# Patient Record
Sex: FEMALE | Race: WHITE
[De-identification: names, ages, dates, MRNs, and addresses within clinical notes are randomized per-mention and may not be internally consistent; named-entity substitution may affect disease eponyms.]

---

## 2024-06-19 ENCOUNTER — NON-APPOINTMENT (OUTPATIENT)
Age: 29
End: 2024-06-19

## 2024-06-19 PROBLEM — Z00.00 ENCOUNTER FOR PREVENTIVE HEALTH EXAMINATION: Status: ACTIVE | Noted: 2024-06-19

## 2024-06-20 ENCOUNTER — NON-APPOINTMENT (OUTPATIENT)
Age: 29
End: 2024-06-20

## 2024-06-20 ENCOUNTER — APPOINTMENT (OUTPATIENT)
Dept: OTOLARYNGOLOGY | Facility: CLINIC | Age: 29
End: 2024-06-20
Payer: COMMERCIAL

## 2024-06-20 VITALS
HEART RATE: 85 BPM | DIASTOLIC BLOOD PRESSURE: 83 MMHG | SYSTOLIC BLOOD PRESSURE: 126 MMHG | TEMPERATURE: 99.5 F | OXYGEN SATURATION: 97 % | BODY MASS INDEX: 29.16 KG/M2 | WEIGHT: 175 LBS | HEIGHT: 65 IN

## 2024-06-20 DIAGNOSIS — R49.0 DYSPHONIA: ICD-10-CM

## 2024-06-20 DIAGNOSIS — J38.2 NODULES OF VOCAL CORDS: ICD-10-CM

## 2024-06-20 DIAGNOSIS — R06.2 WHEEZING: ICD-10-CM

## 2024-06-20 DIAGNOSIS — Z81.8 FAMILY HISTORY OF OTHER MENTAL AND BEHAVIORAL DISORDERS: ICD-10-CM

## 2024-06-20 DIAGNOSIS — Z86.59 PERSONAL HISTORY OF OTHER MENTAL AND BEHAVIORAL DISORDERS: ICD-10-CM

## 2024-06-20 DIAGNOSIS — Z78.9 OTHER SPECIFIED HEALTH STATUS: ICD-10-CM

## 2024-06-20 PROCEDURE — 99204 OFFICE O/P NEW MOD 45 MIN: CPT | Mod: 25

## 2024-06-20 PROCEDURE — 70371 SPEECH EVALUATION COMPLEX: CPT | Mod: 59

## 2024-06-20 PROCEDURE — 31579 LARYNGOSCOPY TELESCOPIC: CPT

## 2024-06-20 RX ORDER — LAMOTRIGINE 200 MG/1
200 TABLET ORAL
Refills: 0 | Status: ACTIVE | COMMUNITY

## 2024-06-20 RX ORDER — LAMOTRIGINE 150 MG/1
150 TABLET ORAL
Refills: 0 | Status: ACTIVE | COMMUNITY

## 2024-06-20 RX ORDER — FAMOTIDINE 40 MG/1
40 TABLET, FILM COATED ORAL
Refills: 0 | Status: ACTIVE | COMMUNITY

## 2024-06-20 RX ORDER — CYCLOBENZAPRINE HYDROCHLORIDE 7.5 MG/1
TABLET, FILM COATED ORAL
Refills: 0 | Status: ACTIVE | COMMUNITY

## 2024-06-20 NOTE — ASSESSMENT
[FreeTextEntry1] : - 6/20/2024 28-year-old female who presents with concern for multiple throat related issues.  She notes that she will have intermittent wheezing, as well as monthly bouts of laryngitis.  On exam today video stroboscopy was essentially normal.  She does have additive mass lesions that are consistent with nodules.  I do not see any evidence of vocal fold dysfunction at this time.  At this point I am recommending voice hygiene and follow-up with speech pathology, for further evaluation again of both nodules as well as potential vocal fold dysfunction.  I am also recommending consultation with pulmonology for formal PFTs to officially rule out asthma as a source of wheezing.  Patient will follow-up with me in 2 to 3 months, sooner should symptoms worsen or fail to improve  - Voice hygiene, handout given - Consultation with speech pathology - Consultation with pulmonology, rule out asthma with PFTs - Follow-up with me in 2 to 3 months, sooner should symptoms worsen or fail to improve.

## 2024-06-20 NOTE — PHYSICAL EXAM
[Normal] : mucosa is normal [Midline] : trachea located in midline position [FreeTextEntry1] : Mild hoarseness, normal range, pitch control and projection

## 2024-06-20 NOTE — HISTORY OF PRESENT ILLNESS
[de-identified] : - 6/20/24 29 yo female who presents with concern for throat issues for 3-4 months.  She notes that wheezing when laughing and sometimes will have coughing with wheezing as well.  She feels that she can't project as well as she could x several months.  She also feels that she is getting laryngitis more frequently even without voice use.  This is now occurring for a few days per month.  No issues chewing, eating or swallowing foods.  No currently changes to voice.   She works in [employer] and uses her voice all day.  She also uses her voice after work as well, she is gregarious.  She had pulmonary function testing and is believe not to have asthma.  No throat issues with strong smells such as smoke or cologne.  She will get a small wheeze when out of breath with exertion.

## 2024-06-20 NOTE — PROCEDURE
[de-identified] : - Procedure Note  Pre-operative Diagnosis: Dysphonia Post-operative Diagnosis: Bilateral additive mass lesion anterior one third likely consistent with nodules Anesthesia: Topical - 1 % Lidocaine/Phenylephrine Procedure:  Flexible Laryngoscopy with Stroboscopy - [facility] [address]   Procedure Details:   The patient was placed in the sitting position.  After decongestant and anesthesia were applied the laryngoscope was passed.  The nasal cavities, nasopharynx, oropharynx, hypopharynx, and larynx were all examined.  Vocal folds were examined during respiration and phonation.  The following findings were noted:  Findings:   Nose: Septum is midline, turbinates are normal, nasal airways patent, mucosa normal Nasopharynx: Adenoids normal, no masses, eustachian tube normal Oropharynx: Pharyngeal walls symmetric and without lesion. Tonsils/fossae symmetric Hypopharynx: Hypopharynx and pyriform sinuses without lesion. No masses or asymmetry.  No pooling of secretions. Larynx:  Epiglottis and aryepiglottic folds were sharp and crisp bilaterally.  Bilateral false vocal folds normal appearance. Airway was widely patent.  Strobe Exam Ratings 		 TVF Appearance: Additive mass lesion anterior one third TVF Mobility: normal Edema/hypertrophy: normal Mucus on TVF: normal Glottic Closure: Hourglass glottic gap Mucosal Wave: Reduced Amplitude of Vibration: Reduced Phase: symmetric Supraglottic Hyperfunction: + Other Findings: none  Condition: Stable.  Patient tolerated procedure well.  Complications: None  --------------------------------------------------------------------  Procedure: Pharyngeal and speech evaluation, by cine or video - CPT 08184	 Voice assessment was recorded via video recording on this date.  Clarity: Hoarse and raspy Range: Normal Pitch Control: Normal Projection: Normal Tremor: None Cough: None  Condition: Stable.  Patient tolerated procedure well. Complications: None

## 2024-07-29 ENCOUNTER — APPOINTMENT (OUTPATIENT)
Dept: OTOLARYNGOLOGY | Facility: CLINIC | Age: 29
End: 2024-07-29
Payer: COMMERCIAL

## 2024-07-29 PROCEDURE — 92524 BEHAVRAL QUALIT ANALYS VOICE: CPT | Mod: GN

## 2024-07-29 PROCEDURE — 31579 LARYNGOSCOPY TELESCOPIC: CPT

## 2024-08-07 ENCOUNTER — APPOINTMENT (OUTPATIENT)
Dept: OTOLARYNGOLOGY | Facility: CLINIC | Age: 29
End: 2024-08-07

## 2024-08-07 PROCEDURE — 92507 TX SP LANG VOICE COMM INDIV: CPT | Mod: GN

## 2024-08-23 ENCOUNTER — APPOINTMENT (OUTPATIENT)
Dept: OTOLARYNGOLOGY | Facility: CLINIC | Age: 29
End: 2024-08-23
Payer: COMMERCIAL

## 2024-08-23 ENCOUNTER — APPOINTMENT (OUTPATIENT)
Dept: PULMONOLOGY | Facility: CLINIC | Age: 29
End: 2024-08-23
Payer: COMMERCIAL

## 2024-08-23 VITALS — WEIGHT: 175 LBS | HEIGHT: 65 IN | HEART RATE: 112 BPM | OXYGEN SATURATION: 98 % | BODY MASS INDEX: 29.16 KG/M2

## 2024-08-23 VITALS
TEMPERATURE: 97.3 F | OXYGEN SATURATION: 98 % | DIASTOLIC BLOOD PRESSURE: 80 MMHG | SYSTOLIC BLOOD PRESSURE: 124 MMHG | HEIGHT: 65 IN | BODY MASS INDEX: 29.16 KG/M2 | HEART RATE: 88 BPM | WEIGHT: 175 LBS

## 2024-08-23 DIAGNOSIS — J38.2 NODULES OF VOCAL CORDS: ICD-10-CM

## 2024-08-23 DIAGNOSIS — R06.2 WHEEZING: ICD-10-CM

## 2024-08-23 DIAGNOSIS — Z86.59 PERSONAL HISTORY OF OTHER MENTAL AND BEHAVIORAL DISORDERS: ICD-10-CM

## 2024-08-23 DIAGNOSIS — R49.0 DYSPHONIA: ICD-10-CM

## 2024-08-23 PROCEDURE — 99204 OFFICE O/P NEW MOD 45 MIN: CPT | Mod: 25

## 2024-08-23 PROCEDURE — 94010 BREATHING CAPACITY TEST: CPT

## 2024-08-23 PROCEDURE — 95012 NITRIC OXIDE EXP GAS DETER: CPT

## 2024-08-23 PROCEDURE — 99214 OFFICE O/P EST MOD 30 MIN: CPT | Mod: 25

## 2024-08-23 PROCEDURE — 31579 LARYNGOSCOPY TELESCOPIC: CPT

## 2024-08-23 NOTE — PROCEDURE
[de-identified] : - Procedure Note  Pre-operative Diagnosis: Dysphonia Post-operative Diagnosis: Bilateral additive mass lesion anterior one third likely consistent with nodules- improved compared with previous Anesthesia: Topical - 1 % Lidocaine/Phenylephrine Procedure:  Flexible Laryngoscopy with Stroboscopy - [facility] [address]   Procedure Details:   The patient was placed in the sitting position.  After decongestant and anesthesia were applied the laryngoscope was passed.  The nasal cavities, nasopharynx, oropharynx, hypopharynx, and larynx were all examined.  Vocal folds were examined during respiration and phonation.  The following findings were noted:  Findings:   Nose: Septum is midline, turbinates are normal, nasal airways patent, mucosa normal Nasopharynx: Adenoids normal, no masses, eustachian tube normal Oropharynx: Pharyngeal walls symmetric and without lesion. Tonsils/fossae symmetric Hypopharynx: Hypopharynx and pyriform sinuses without lesion. No masses or asymmetry.  No pooling of secretions. Larynx:  Epiglottis and aryepiglottic folds were sharp and crisp bilaterally.  Bilateral false vocal folds normal appearance. Airway was widely patent.  Strobe Exam Ratings 		 TVF Appearance: Additive mass lesion anterior one third- improved compared with previous TVF Mobility: normal Edema/hypertrophy: normal Mucus on TVF: normal Glottic Closure: Hourglass glottic gap Mucosal Wave: Reduced Amplitude of Vibration: Reduced Phase: symmetric Supraglottic Hyperfunction: + Other Findings: none  Condition: Stable.  Patient tolerated procedure well.  Complications: None  --------------------------------------------------------------------  Procedure: Pharyngeal and speech evaluation, by cine or video - CPT 87601	 Voice assessment was recorded via video recording on this date.  Clarity: Hoarse and raspy Range: Normal Pitch Control: Normal Projection: Normal Tremor: None Cough: None  Condition: Stable.  Patient tolerated procedure well. Complications: None

## 2024-08-23 NOTE — HISTORY OF PRESENT ILLNESS
[de-identified] : - 6/20/24 29 yo female who presents with concern for throat issues for 3-4 months.  She notes that wheezing when laughing and sometimes will have coughing with wheezing as well.  She feels that she can't project as well as she could x several months.  She also feels that she is getting laryngitis more frequently even without voice use.  This is now occurring for a few days per month.  No issues chewing, eating or swallowing foods.  No currently changes to voice.   She works in [employer] and uses her voice all day.  She also uses her voice after work as well, she is gregarious.  She had pulmonary function testing and is believe not to have asthma.  No throat issues with strong smells such as smoke or cologne.  She will get a small wheeze when out of breath with exertion. - [FreeTextEntry1] : 8/23/24: Patient has been working with SLP and is doing voice exercises and notes "48%" improvement in terms of her voice. She saw Dr. Flowers since last visit and was found to have mild restriction on spirometry, methacholine challenge pending.  No new ENT issues otherwise.

## 2024-08-23 NOTE — PROCEDURE
[de-identified] : - Procedure Note  Pre-operative Diagnosis: Dysphonia Post-operative Diagnosis: Bilateral additive mass lesion anterior one third likely consistent with nodules- improved compared with previous Anesthesia: Topical - 1 % Lidocaine/Phenylephrine Procedure:  Flexible Laryngoscopy with Stroboscopy - [facility] [address]   Procedure Details:   The patient was placed in the sitting position.  After decongestant and anesthesia were applied the laryngoscope was passed.  The nasal cavities, nasopharynx, oropharynx, hypopharynx, and larynx were all examined.  Vocal folds were examined during respiration and phonation.  The following findings were noted:  Findings:   Nose: Septum is midline, turbinates are normal, nasal airways patent, mucosa normal Nasopharynx: Adenoids normal, no masses, eustachian tube normal Oropharynx: Pharyngeal walls symmetric and without lesion. Tonsils/fossae symmetric Hypopharynx: Hypopharynx and pyriform sinuses without lesion. No masses or asymmetry.  No pooling of secretions. Larynx:  Epiglottis and aryepiglottic folds were sharp and crisp bilaterally.  Bilateral false vocal folds normal appearance. Airway was widely patent.  Strobe Exam Ratings 		 TVF Appearance: Additive mass lesion anterior one third- improved compared with previous TVF Mobility: normal Edema/hypertrophy: normal Mucus on TVF: normal Glottic Closure: Hourglass glottic gap Mucosal Wave: Reduced Amplitude of Vibration: Reduced Phase: symmetric Supraglottic Hyperfunction: + Other Findings: none  Condition: Stable.  Patient tolerated procedure well.  Complications: None  --------------------------------------------------------------------  Procedure: Pharyngeal and speech evaluation, by cine or video - CPT 97939	 Voice assessment was recorded via video recording on this date.  Clarity: Hoarse and raspy Range: Normal Pitch Control: Normal Projection: Normal Tremor: None Cough: None  Condition: Stable.  Patient tolerated procedure well. Complications: None

## 2024-08-23 NOTE — ASSESSMENT
[FreeTextEntry1] : Data reviewed:  [place] 08/23/2024 : mild restriction, FEV1 76% / FENO 12  Impression: Wheezing  Plan: No evidence asthma on exam today but does have fam hx and some environmental triggers. Given daily symptoms, merits further eval w methacholine challenge.

## 2024-08-23 NOTE — HISTORY OF PRESENT ILLNESS
[de-identified] : - 6/20/24 27 yo female who presents with concern for throat issues for 3-4 months.  She notes that wheezing when laughing and sometimes will have coughing with wheezing as well.  She feels that she can't project as well as she could x several months.  She also feels that she is getting laryngitis more frequently even without voice use.  This is now occurring for a few days per month.  No issues chewing, eating or swallowing foods.  No currently changes to voice.   She works in [employer] and uses her voice all day.  She also uses her voice after work as well, she is gregarious.  She had pulmonary function testing and is believe not to have asthma.  No throat issues with strong smells such as smoke or cologne.  She will get a small wheeze when out of breath with exertion. - [FreeTextEntry1] : 8/23/24: Patient has been working with SLP and is doing voice exercises and notes "48%" improvement in terms of her voice. She saw Dr. Flowers since last visit and was found to have mild restriction on spirometry, methacholine challenge pending.  No new ENT issues otherwise.

## 2024-08-23 NOTE — HISTORY OF PRESENT ILLNESS
[Never] : never [TextBox_4] : 08/23/2024: Asked to evaluate patient by Dr Mcleod for wheezing. Never smoker w no hx lung disease but fam hx asthma in sister. Few months ago started to notice that if she laughed or coughed she would wheeze. Cough became more frequent and would wheeze w the cough. Exercises without dyspnea. But recently has at times wheezed with exercise as well. It sounds like the wheeze is mostly limited to the exact moment of coughing or laughing, but at one point it did at least last long enough that her sister gave her a neb. Her sister is a [facility] fellow at [employer]. Only recent environmental change is getting a new job after being unemployed for a year (which was very stressful). Started the new job in March but her symptoms started before that. Working in [employer]. Lives in [place], does have some bathroom mold. Has been using chemicals to try to clean this which may add to the wheezing. No pets or pests.

## 2024-08-23 NOTE — ASSESSMENT
[FreeTextEntry1] : - 6/20/2024 28-year-old female who presents with concern for multiple throat related issues.  She notes that she will have intermittent wheezing, as well as monthly bouts of laryngitis.  On exam today video stroboscopy was essentially normal.  She does have additive mass lesions that are consistent with nodules.  I do not see any evidence of vocal fold dysfunction at this time.  At this point I am recommending voice hygiene and follow-up with speech pathology, for further evaluation again of both nodules as well as potential vocal fold dysfunction.  I am also recommending consultation with pulmonology for formal PFTs to officially rule out asthma as a source of wheezing.  Patient will follow-up with me in 2 to 3 months, sooner should symptoms worsen or fail to improve  8/23/24: Patient has been working with SLP and is doing voice exercises and notes 48% improvement in terms of her voice. She saw Dr. Flowers since last visit and was found to have mild restriction on spirometry, methacholine challenge pending. On physical exam/ laryngosocopy/ strobosocopy she was found to have some improvement in bilateral additive mass lesions. I recommended she continue to work with SLP and to follow up with Dr. Flowers. Follow up with me in 2 months.  - Voice hygiene, handout given - Continue to work with speech pathology/ speech exercises - Continue to follow up with pulmonology for methacholine challenge - Follow-up with me in 2 months, sooner should symptoms worsen or fail to improve.
[employer]

## 2024-08-23 NOTE — PROCEDURE
[de-identified] : - Procedure Note  Pre-operative Diagnosis: Dysphonia Post-operative Diagnosis: Bilateral additive mass lesion anterior one third likely consistent with nodules- improved compared with previous Anesthesia: Topical - 1 % Lidocaine/Phenylephrine Procedure:  Flexible Laryngoscopy with Stroboscopy - [facility] [address]   Procedure Details:   The patient was placed in the sitting position.  After decongestant and anesthesia were applied the laryngoscope was passed.  The nasal cavities, nasopharynx, oropharynx, hypopharynx, and larynx were all examined.  Vocal folds were examined during respiration and phonation.  The following findings were noted:  Findings:   Nose: Septum is midline, turbinates are normal, nasal airways patent, mucosa normal Nasopharynx: Adenoids normal, no masses, eustachian tube normal Oropharynx: Pharyngeal walls symmetric and without lesion. Tonsils/fossae symmetric Hypopharynx: Hypopharynx and pyriform sinuses without lesion. No masses or asymmetry.  No pooling of secretions. Larynx:  Epiglottis and aryepiglottic folds were sharp and crisp bilaterally.  Bilateral false vocal folds normal appearance. Airway was widely patent.  Strobe Exam Ratings 		 TVF Appearance: Additive mass lesion anterior one third- improved compared with previous TVF Mobility: normal Edema/hypertrophy: normal Mucus on TVF: normal Glottic Closure: Hourglass glottic gap Mucosal Wave: Reduced Amplitude of Vibration: Reduced Phase: symmetric Supraglottic Hyperfunction: + Other Findings: none  Condition: Stable.  Patient tolerated procedure well.  Complications: None  --------------------------------------------------------------------  Procedure: Pharyngeal and speech evaluation, by cine or video - CPT 22609	 Voice assessment was recorded via video recording on this date.  Clarity: Hoarse and raspy Range: Normal Pitch Control: Normal Projection: Normal Tremor: None Cough: None  Condition: Stable.  Patient tolerated procedure well. Complications: None

## 2024-08-23 NOTE — HISTORY OF PRESENT ILLNESS
[de-identified] : - 6/20/24 27 yo female who presents with concern for throat issues for 3-4 months.  She notes that wheezing when laughing and sometimes will have coughing with wheezing as well.  She feels that she can't project as well as she could x several months.  She also feels that she is getting laryngitis more frequently even without voice use.  This is now occurring for a few days per month.  No issues chewing, eating or swallowing foods.  No currently changes to voice.   She works in [employer] and uses her voice all day.  She also uses her voice after work as well, she is gregarious.  She had pulmonary function testing and is believe not to have asthma.  No throat issues with strong smells such as smoke or cologne.  She will get a small wheeze when out of breath with exertion. - [FreeTextEntry1] : 8/23/24: Patient has been working with SLP and is doing voice exercises and notes "48%" improvement in terms of her voice. She saw Dr. Flowers since last visit and was found to have mild restriction on spirometry, methacholine challenge pending.  No new ENT issues otherwise.

## 2024-09-04 ENCOUNTER — APPOINTMENT (OUTPATIENT)
Dept: OTOLARYNGOLOGY | Facility: CLINIC | Age: 29
End: 2024-09-04

## 2024-09-04 PROCEDURE — 92507 TX SP LANG VOICE COMM INDIV: CPT | Mod: GN

## 2024-09-10 ENCOUNTER — NON-APPOINTMENT (OUTPATIENT)
Age: 29
End: 2024-09-10

## 2024-09-11 ENCOUNTER — APPOINTMENT (OUTPATIENT)
Dept: PULMONOLOGY | Facility: CLINIC | Age: 29
End: 2024-09-11
Payer: COMMERCIAL

## 2024-09-11 PROCEDURE — 95070 INHLJ BRNCL CHALLENGE TSTG: CPT

## 2024-09-11 PROCEDURE — 94070 EVALUATION OF WHEEZING: CPT

## 2024-09-25 ENCOUNTER — APPOINTMENT (OUTPATIENT)
Dept: OTOLARYNGOLOGY | Facility: CLINIC | Age: 29
End: 2024-09-25

## 2024-10-16 ENCOUNTER — APPOINTMENT (OUTPATIENT)
Dept: OTOLARYNGOLOGY | Facility: CLINIC | Age: 29
End: 2024-10-16
Payer: COMMERCIAL

## 2024-10-16 PROCEDURE — 92507 TX SP LANG VOICE COMM INDIV: CPT | Mod: GN

## 2024-10-25 ENCOUNTER — APPOINTMENT (OUTPATIENT)
Dept: OTOLARYNGOLOGY | Facility: CLINIC | Age: 29
End: 2024-10-25
Payer: COMMERCIAL

## 2024-10-25 VITALS
SYSTOLIC BLOOD PRESSURE: 108 MMHG | OXYGEN SATURATION: 98 % | TEMPERATURE: 97.3 F | WEIGHT: 179 LBS | DIASTOLIC BLOOD PRESSURE: 71 MMHG | HEART RATE: 88 BPM | BODY MASS INDEX: 29.82 KG/M2 | HEIGHT: 65 IN

## 2024-10-25 DIAGNOSIS — R49.0 DYSPHONIA: ICD-10-CM

## 2024-10-25 DIAGNOSIS — J38.2 NODULES OF VOCAL CORDS: ICD-10-CM

## 2024-10-25 DIAGNOSIS — R06.2 WHEEZING: ICD-10-CM

## 2024-10-25 PROCEDURE — 99215 OFFICE O/P EST HI 40 MIN: CPT | Mod: 25

## 2024-10-25 PROCEDURE — 31579 LARYNGOSCOPY TELESCOPIC: CPT

## 2024-11-20 ENCOUNTER — APPOINTMENT (OUTPATIENT)
Dept: OTOLARYNGOLOGY | Facility: CLINIC | Age: 29
End: 2024-11-20
Payer: COMMERCIAL

## 2024-11-20 PROCEDURE — 92507 TX SP LANG VOICE COMM INDIV: CPT | Mod: GN

## 2025-01-31 ENCOUNTER — NON-APPOINTMENT (OUTPATIENT)
Age: 30
End: 2025-01-31

## 2025-01-31 ENCOUNTER — APPOINTMENT (OUTPATIENT)
Dept: OTOLARYNGOLOGY | Facility: CLINIC | Age: 30
End: 2025-01-31
Payer: COMMERCIAL

## 2025-01-31 VITALS
HEART RATE: 95 BPM | SYSTOLIC BLOOD PRESSURE: 115 MMHG | TEMPERATURE: 96.6 F | WEIGHT: 180 LBS | BODY MASS INDEX: 29.99 KG/M2 | HEIGHT: 65 IN | DIASTOLIC BLOOD PRESSURE: 75 MMHG | OXYGEN SATURATION: 98 %

## 2025-01-31 DIAGNOSIS — J38.2 NODULES OF VOCAL CORDS: ICD-10-CM

## 2025-01-31 DIAGNOSIS — R49.0 DYSPHONIA: ICD-10-CM

## 2025-01-31 PROCEDURE — 31579 LARYNGOSCOPY TELESCOPIC: CPT

## 2025-01-31 PROCEDURE — 99214 OFFICE O/P EST MOD 30 MIN: CPT | Mod: 25

## 2025-02-03 ENCOUNTER — APPOINTMENT (OUTPATIENT)
Dept: OTOLARYNGOLOGY | Facility: CLINIC | Age: 30
End: 2025-02-03
Payer: COMMERCIAL

## 2025-02-03 PROCEDURE — 92507 TX SP LANG VOICE COMM INDIV: CPT | Mod: GN

## 2025-03-31 ENCOUNTER — APPOINTMENT (OUTPATIENT)
Dept: OTOLARYNGOLOGY | Facility: CLINIC | Age: 30
End: 2025-03-31
Payer: COMMERCIAL

## 2025-03-31 PROCEDURE — 92507 TX SP LANG VOICE COMM INDIV: CPT | Mod: GN

## 2025-04-28 ENCOUNTER — APPOINTMENT (OUTPATIENT)
Dept: OTOLARYNGOLOGY | Facility: CLINIC | Age: 30
End: 2025-04-28
Payer: COMMERCIAL

## 2025-04-28 PROCEDURE — 92507 TX SP LANG VOICE COMM INDIV: CPT | Mod: GN

## 2025-05-23 ENCOUNTER — APPOINTMENT (OUTPATIENT)
Dept: OTOLARYNGOLOGY | Facility: CLINIC | Age: 30
End: 2025-05-23
Payer: COMMERCIAL

## 2025-05-23 VITALS
HEART RATE: 91 BPM | SYSTOLIC BLOOD PRESSURE: 123 MMHG | OXYGEN SATURATION: 99 % | BODY MASS INDEX: 29.99 KG/M2 | HEIGHT: 65 IN | TEMPERATURE: 98.1 F | WEIGHT: 180 LBS | DIASTOLIC BLOOD PRESSURE: 84 MMHG

## 2025-05-23 DIAGNOSIS — J38.2 NODULES OF VOCAL CORDS: ICD-10-CM

## 2025-05-23 DIAGNOSIS — R49.0 DYSPHONIA: ICD-10-CM

## 2025-05-23 PROCEDURE — 31579 LARYNGOSCOPY TELESCOPIC: CPT

## 2025-05-23 PROCEDURE — 99214 OFFICE O/P EST MOD 30 MIN: CPT | Mod: 25

## 2025-06-23 ENCOUNTER — APPOINTMENT (OUTPATIENT)
Dept: OTOLARYNGOLOGY | Facility: CLINIC | Age: 30
End: 2025-06-23